# Patient Record
Sex: MALE | Race: WHITE | NOT HISPANIC OR LATINO | URBAN - METROPOLITAN AREA
[De-identification: names, ages, dates, MRNs, and addresses within clinical notes are randomized per-mention and may not be internally consistent; named-entity substitution may affect disease eponyms.]

---

## 2024-11-22 ENCOUNTER — OFFICE VISIT (OUTPATIENT)
Dept: URGENT CARE | Age: 39
End: 2024-11-22
Payer: COMMERCIAL

## 2024-11-22 ENCOUNTER — APPOINTMENT (OUTPATIENT)
Dept: URGENT CARE | Age: 39
End: 2024-11-22
Payer: COMMERCIAL

## 2024-11-22 VITALS
WEIGHT: 280 LBS | HEIGHT: 69 IN | BODY MASS INDEX: 41.47 KG/M2 | HEART RATE: 77 BPM | TEMPERATURE: 98 F | RESPIRATION RATE: 16 BRPM | OXYGEN SATURATION: 98 % | DIASTOLIC BLOOD PRESSURE: 92 MMHG | SYSTOLIC BLOOD PRESSURE: 147 MMHG

## 2024-11-22 DIAGNOSIS — L03.011 PARONYCHIA OF RIGHT THUMB: Primary | ICD-10-CM

## 2024-11-22 RX ORDER — CEPHALEXIN 500 MG/1
500 CAPSULE ORAL 4 TIMES DAILY
Qty: 21 CAPSULE | Refills: 0 | Status: SHIPPED | OUTPATIENT
Start: 2024-11-22 | End: 2024-12-02

## 2024-11-22 ASSESSMENT — PAIN SCALES - GENERAL: PAINLEVEL_OUTOF10: 6

## 2024-11-22 NOTE — PATIENT INSTRUCTIONS
Continue mupirocin twice a day for 1 week  Precede application of antibiotic ointment with 10-minute warm water soaks as discussed  If symptoms worsen or are not improving in the next few days, may start oral antibiotic  See PCP if symptoms not resolving in 1 week

## 2024-11-22 NOTE — PROGRESS NOTES
"Subjective   Patient ID: Shiva Biggs \"Gabbi" is a 39 y.o. male. They present today with a chief complaint of ingrown nail (Right thumb x 2 days).    History of Present Illness  HPI  Patient presents with 2 days of an infected cuticle on his right thumb.  Patient has cut his nails extremely short and he believes this has led to an ingrown nail.  No pus or discharge.  No trauma to the thumb or nail.  Tenderness and redness but no significant swelling noted.  He has had this in the past and has tried leftover mupirocin for 2 doses with no improvement.  Past Medical History  Allergies as of 11/22/2024    (No Known Allergies)       (Not in a hospital admission)       Past Medical History:   Diagnosis Date    Other specified health status     No pertinent past medical history       Past Surgical History:   Procedure Laterality Date    OTHER SURGICAL HISTORY  09/30/2021    Eye surgery    OTHER SURGICAL HISTORY  11/22/2022    Nose surgery        reports that he has never smoked. He has never used smokeless tobacco. He reports current alcohol use of about 12.0 standard drinks of alcohol per week. He reports that he does not use drugs.    Review of Systems  Review of Systems       As in history of present illness                        Objective    Vitals:    11/22/24 1151   BP: (!) 147/92   BP Location: Left arm   Patient Position: Sitting   BP Cuff Size: Large adult   Pulse: 77   Resp: 16   Temp: 36.7 °C (98 °F)   TempSrc: Oral   SpO2: 98%   Weight: 127 kg (280 lb)   Height: 1.753 m (5' 9\")     No LMP for male patient.    Physical Exam  General: Vitals noted, no distress. Afebrile.   Vascular: Right upper extremity warm and dry with good peripheral pulses noted  Extremities: No peripheral edema.   Skin: No rash. No bruising or discoloration. No cuts or abrasions small area of erythema and tenderness.  Over right lateral thumb cuticle with no obvious pus pocket or discharge  Neuro: No focal neurologic deficits, " NIH score of 0.    Procedures    Point of Care Test & Imaging Results from this visit  No results found for this visit on 11/22/24.   No results found.    Diagnostic study results (if any) were reviewed by Yifan Adan MD.    Assessment/Plan   Allergies, medications, history, and pertinent labs/EKGs/Imaging reviewed by Yifan Adan MD.     Medical Decision Making  At time of discharge patient was clinically well-appearing and HDS for outpatient management. The patient and/or family was educated regarding diagnosis, supportive care, OTC and Rx medications. The patient and/or family was given the opportunity to ask questions prior to discharge.  They verbalized understanding of my discussion of the plans for treatment, expected course, indications to return to  or seek further evaluation in ED, and the need for timely follow up as directed.   They were provided with a work/school excuse if requested.    Orders and Diagnoses  Diagnoses and all orders for this visit:  Paronychia of right thumb  -     cephalexin (Keflex) 500 mg capsule; Take 1 capsule (500 mg) by mouth 4 times a day for 10 days.      Medical Admin Record      Patient disposition: Home    Electronically signed by Yifan Adan MD  12:08 PM

## 2025-07-11 ENCOUNTER — APPOINTMENT (OUTPATIENT)
Dept: PRIMARY CARE | Facility: CLINIC | Age: 40
End: 2025-07-11
Payer: COMMERCIAL

## 2025-08-14 ENCOUNTER — APPOINTMENT (OUTPATIENT)
Dept: DERMATOLOGY | Facility: CLINIC | Age: 40
End: 2025-08-14
Payer: COMMERCIAL

## 2025-09-05 ENCOUNTER — APPOINTMENT (OUTPATIENT)
Dept: PRIMARY CARE | Facility: CLINIC | Age: 40
End: 2025-09-05
Payer: COMMERCIAL

## 2025-12-18 ENCOUNTER — APPOINTMENT (OUTPATIENT)
Dept: PRIMARY CARE | Facility: CLINIC | Age: 40
End: 2025-12-18
Payer: COMMERCIAL